# Patient Record
Sex: MALE | Race: WHITE | Employment: UNEMPLOYED | URBAN - METROPOLITAN AREA
[De-identification: names, ages, dates, MRNs, and addresses within clinical notes are randomized per-mention and may not be internally consistent; named-entity substitution may affect disease eponyms.]

---

## 2020-12-23 ENCOUNTER — HOSPITAL ENCOUNTER (OUTPATIENT)
Dept: INFUSION THERAPY | Age: 55
Discharge: HOME | End: 2020-12-23

## 2020-12-23 ENCOUNTER — INITIAL CONSULT (OUTPATIENT)
Dept: ONCOLOGY | Age: 55
End: 2020-12-23

## 2020-12-23 VITALS
SYSTOLIC BLOOD PRESSURE: 147 MMHG | BODY MASS INDEX: 23.25 KG/M2 | OXYGEN SATURATION: 96 % | DIASTOLIC BLOOD PRESSURE: 100 MMHG | WEIGHT: 157 LBS | RESPIRATION RATE: 16 BRPM | HEIGHT: 69 IN | HEART RATE: 88 BPM | TEMPERATURE: 96.9 F

## 2020-12-23 DIAGNOSIS — C18.7 MALIGNANT NEOPLASM OF SIGMOID COLON (HCC): ICD-10-CM

## 2020-12-23 LAB
ALBUMIN SERPL-MCNC: 4.7 GM/DL (ref 3.4–5)
ALP BLD-CCNC: 80 IU/L (ref 40–128)
ALT SERPL-CCNC: 11 U/L (ref 10–40)
ANION GAP SERPL CALCULATED.3IONS-SCNC: 13 MMOL/L (ref 4–16)
AST SERPL-CCNC: 13 IU/L (ref 15–37)
BASOPHILS ABSOLUTE: 0.1 K/CU MM
BASOPHILS RELATIVE PERCENT: 0.4 % (ref 0–1)
BILIRUB SERPL-MCNC: 1 MG/DL (ref 0–1)
BUN BLDV-MCNC: 16 MG/DL (ref 6–23)
CALCIUM SERPL-MCNC: 9.2 MG/DL (ref 8.3–10.6)
CEA: 3.2 NG/ML
CHLORIDE BLD-SCNC: 106 MMOL/L (ref 99–110)
CO2: 22 MMOL/L (ref 21–32)
CREAT SERPL-MCNC: 1.1 MG/DL (ref 0.9–1.3)
DIFFERENTIAL TYPE: ABNORMAL
EOSINOPHILS ABSOLUTE: 0.1 K/CU MM
EOSINOPHILS RELATIVE PERCENT: 1.1 % (ref 0–3)
ERYTHROCYTE SEDIMENTATION RATE: 5 MM/HR (ref 0–20)
GFR AFRICAN AMERICAN: >60 ML/MIN/1.73M2
GFR NON-AFRICAN AMERICAN: >60 ML/MIN/1.73M2
GLUCOSE BLD-MCNC: 97 MG/DL (ref 70–99)
HCT VFR BLD CALC: 42.3 % (ref 42–52)
HEMOGLOBIN: 14.9 GM/DL (ref 13.5–18)
LACTATE DEHYDROGENASE: 196 IU/L (ref 120–246)
LYMPHOCYTES ABSOLUTE: 1.9 K/CU MM
LYMPHOCYTES RELATIVE PERCENT: 16 % (ref 24–44)
MCH RBC QN AUTO: 30.9 PG (ref 27–31)
MCHC RBC AUTO-ENTMCNC: 35.2 % (ref 32–36)
MCV RBC AUTO: 87.8 FL (ref 78–100)
MONOCYTES ABSOLUTE: 0.8 K/CU MM
MONOCYTES RELATIVE PERCENT: 6.7 % (ref 0–4)
PDW BLD-RTO: 16.1 % (ref 11.7–14.9)
PLATELET # BLD: 247 K/CU MM (ref 140–440)
PMV BLD AUTO: 10.4 FL (ref 7.5–11.1)
POTASSIUM SERPL-SCNC: 4.1 MMOL/L (ref 3.5–5.1)
RBC # BLD: 4.82 M/CU MM (ref 4.6–6.2)
SEGMENTED NEUTROPHILS ABSOLUTE COUNT: 9.2 K/CU MM
SEGMENTED NEUTROPHILS RELATIVE PERCENT: 75.8 % (ref 36–66)
SODIUM BLD-SCNC: 141 MMOL/L (ref 135–145)
TOTAL PROTEIN: 7.1 GM/DL (ref 6.4–8.2)
WBC # BLD: 12.1 K/CU MM (ref 4–10.5)

## 2020-12-23 PROCEDURE — 99204 OFFICE O/P NEW MOD 45 MIN: CPT | Performed by: INTERNAL MEDICINE

## 2020-12-23 PROCEDURE — 36415 COLL VENOUS BLD VENIPUNCTURE: CPT

## 2020-12-23 PROCEDURE — 80053 COMPREHEN METABOLIC PANEL: CPT

## 2020-12-23 PROCEDURE — 85652 RBC SED RATE AUTOMATED: CPT

## 2020-12-23 PROCEDURE — 99201 HC NEW PT, OUTPT VISIT LEVEL 1: CPT

## 2020-12-23 PROCEDURE — 83615 LACTATE (LD) (LDH) ENZYME: CPT

## 2020-12-23 PROCEDURE — 82378 CARCINOEMBRYONIC ANTIGEN: CPT

## 2020-12-23 PROCEDURE — 85025 COMPLETE CBC W/AUTO DIFF WBC: CPT

## 2020-12-23 RX ORDER — OXYCODONE HYDROCHLORIDE 15 MG/1
15 TABLET ORAL EVERY 4 HOURS PRN
Qty: 90 TABLET | Refills: 0 | Status: SHIPPED | OUTPATIENT
Start: 2020-12-23 | End: 2020-12-23

## 2020-12-23 RX ORDER — OXYCODONE HYDROCHLORIDE 15 MG/1
15 TABLET ORAL EVERY 4 HOURS PRN
Qty: 90 TABLET | Refills: 0 | Status: SHIPPED | OUTPATIENT
Start: 2020-12-23 | End: 2021-01-04 | Stop reason: SDUPTHER

## 2020-12-23 SDOH — HEALTH STABILITY: MENTAL HEALTH: HOW OFTEN DO YOU HAVE A DRINK CONTAINING ALCOHOL?: NOT ASKED

## 2020-12-23 SDOH — HEALTH STABILITY: MENTAL HEALTH: HOW MANY STANDARD DRINKS CONTAINING ALCOHOL DO YOU HAVE ON A TYPICAL DAY?: NOT ASKED

## 2020-12-23 ASSESSMENT — PATIENT HEALTH QUESTIONNAIRE - PHQ9
SUM OF ALL RESPONSES TO PHQ9 QUESTIONS 1 & 2: 0
1. LITTLE INTEREST OR PLEASURE IN DOING THINGS: 0
2. FEELING DOWN, DEPRESSED OR HOPELESS: 0
SUM OF ALL RESPONSES TO PHQ QUESTIONS 1-9: 0

## 2020-12-23 NOTE — PROGRESS NOTES
Patient Name:  Lester Mehta  Patient :  1965  Patient MRN:  [id number]     Primary Oncologist: Kevin Jewell MD  Referring Provider: No primary care provider on file. Date of Service: 2020      Reason for Consult: To evaluate the patient with colon cancer. Chief Complaint:    Chief Complaint   Patient presents with    New Patient     Patient active problem list:    Colon cancer    HPI:   Otoniel Duffy is a 39-year-old very pleasant gentleman who recently moves to [facility], presented to me on 20 to establish care for his colorectal cancer. He stated that he was diagnosed with terminal colorectal cancer around May 2020. He stated that he chose not to have further therapy at that time. He was given 5 months to live only. He stated that he has significant pain in his perianal area and complains of bleeding per rectum. I couldn't get proper records from [place] yet. Besides pain and bleeding, he doesn't have any other significant symptoms at today visit. Past Medical History:     Significant for  1. Colon cancer                                                    Past Surgery History:    No pertinent surgical history. Social History:   He denies smoking, alcohol drinking or illicit drug abuse. He is currently living in [facility]. Family History:    Significant for cancer in his brother. No Known Allergies    No current outpatient medications on file prior to visit. No current facility-administered medications on file prior to visit. Review of Systems:  Constitutional:  Has weight loss, No fever, No chills, No night sweats. Energy level is low.   Eyes:  No diplopia, No transient or permanent loss of treatment if that will help with his condition. I will request PET/CT scan to determine the extent of his disease. I will see him back after PET/CT scan. Since he has significant pain, I will continue with his pain medications for now. I answered all his questions and concerns for today. Thank you.

## 2020-12-23 NOTE — PROGRESS NOTES
MA Rooming Questions  Patient: Pepe Mann  MRN: [id number]    Date: 12/23/2020        New Patient    5. Did the patient have a depression screening completed today?  Yes    PHQ-9 Total Score: 0 (12/23/2020  2:16 PM)       PHQ-9 Given to (if applicable):               PHQ-9 Score (if applicable):                     [] Positive     []  Negative              Does question #9 need addressed (if applicable)                     [] Yes    []  No               Camille Nation MA

## 2021-01-03 NOTE — PROGRESS NOTES
Patient Name:  Hedy Neri  Patient :  1965  Patient MRN:  [id number]     Primary Oncologist: Navdeep Ramirez MD  Referring Provider: No primary care provider on file. Date of Service: 2021     Chief Complaint:    Chief Complaint   Patient presents with    Follow-up     Patient active problem list:    Colon cancer    HPI:   Marta Ruelas is a 26-year-old very pleasant gentleman who recently moves to [facility], presented to me on 20 to establish care for his colorectal cancer. He stated that he was diagnosed with terminal colorectal cancer around May 2020. He stated that he chose not to have further therapy at that time. He was given 5 months to live only. He stated that he has significant pain in his perianal area and complains of bleeding per rectum. Laboratory work ups done on 20 showed mild leukocytosis. Rests of blood tests, including hemoglobin, LDH, ESR, CEA, kidney and liver function were normal.     According to interventional pulmonary note from 2019, he doesn't seem to have any malignant process. He claimed to have lung cancer at that time. CT scan of the chest done on 2019 showed tiny nodule in the left upper lobe with some minimal associated groundglass density, overall measuring less than 6 mm. Based on the presence of his part solid nodule with a solid component measuring at or below 5 mm, with stable edema over the last 2 months, radiologist recommend additional follow-up in 15 months. On 2020, he presented to me for follow-up. On today visit, I reviewed with him findings on laboratory test.  He is scheduled for PET scan on 2021 and I will see him again after that. He claimed that he has metastatic colon cancer and I am very much concerned about wrong informations. I recommended to have PET scan as planned and I will see him again after that.     He continues to complain above bleeding per rectum and rectal pain.  However his hemoglobin is completely normal.    Meanwhile, I will make a referral to pain management for further evaluation. He does not have any other new significant symptoms at today's visit. Past Medical History:     Significant for  1. Colon cancer                                                    Past Surgery History:    No pertinent surgical history. Social History:   He denies smoking, alcohol drinking or illicit drug abuse. He is currently living in [place]. Family History:    Significant for cancer in his brother. Allergies:  No known drug allergies. Review of Systems: \"Per interval history; otherwise 10 point ROS is negative. \"  His energy level is fair, appetite, and sleep are okay. He denies fever, chills, night sweats, cough, shortness of breath, chest pain, hemoptysis, or palpitations. His bowel and bladder functions are normal. He doesn't have nausea, vomiting, abdominal pain, diarrhea, constipation, dysuria, loss of appetite, or weight loss. He denies neuropathy. He complains of bleeding per rectum. He denies any clotting issue. He complains of pain in his perineal area. Denies anxiety or depression. The rest of the systems are unremarkable.       Vital Signs: BP (!) 150/91 (Site: Left Upper Arm, Position: Sitting, Cuff Size: Medium Adult)   Pulse 94   Temp 97.1 °F (36.2 °C) (Infrared)   Resp 18   Ht 5' 9\" (1.753 m)   Wt 158 lb 12.8 oz (72 kg)   SpO2 98%   BMI 23.45 kg/m²      Physical Exam:  CONSTITUTIONAL: awake, cooperative, no apparent distress, alert,    EYES: pupils equal, round and reactive to light, sclera clear and conjunctiva normal  ENT: Normocephalic, atraumatic, without obvious abnormality,   NECK: supple, symmetrical, no jugular venous distension and no carotid bruits   HEMATOLOGIC/LYMPHATIC: no cervical, supraclavicular or axillary lymphadenopathy   LUNGS: VBS, no rhonchi, no increased work of breathing and clear to auscultation, no wheezes, no crackles,    CARDIOVASCULAR: regular rate and rhythm, normal S1 and S2, no murmur noted  ABDOMEN: soft, non-distended, normal bowel sounds x 4, non-tender, no masses palpated, no hepatosplenomegaly   MUSCULOSKELETAL: full range of motion noted, tone is normal  NEUROLOGIC: awake, alert, oriented to name, place and time. Motor skills grossly intact. SKIN: Normal skin color, texture, turgor and no jaundice.  appears intact   EXTREMITIES: no cyanosis, no clubbing, no LE edema, no leg swelling      Labs:  Hematology:  Lab Results   Component Value Date    WBC 12.1 (H) 12/23/2020    RBC 4.82 12/23/2020    HGB 14.9 12/23/2020    HCT 42.3 12/23/2020    MCV 87.8 12/23/2020    MCH 30.9 12/23/2020    MCHC 35.2 12/23/2020    RDW 16.1 (H) 12/23/2020     12/23/2020    MPV 10.4 12/23/2020    SEGSPCT 75.8 (H) 12/23/2020    EOSRELPCT 1.1 12/23/2020    BASOPCT 0.4 12/23/2020    LYMPHOPCT 16.0 (L) 12/23/2020    MONOPCT 6.7 (H) 12/23/2020    SEGSABS 9.2 12/23/2020    EOSABS 0.1 12/23/2020    BASOSABS 0.1 12/23/2020    LYMPHSABS 1.9 12/23/2020    MONOSABS 0.8 12/23/2020    DIFFTYPE AUTOMATED DIFFERENTIAL 12/23/2020     Lab Results   Component Value Date    ESR 5 12/23/2020     Chemistry:  Lab Results   Component Value Date     12/23/2020    K 4.1 12/23/2020     12/23/2020    CO2 22 12/23/2020    BUN 16 12/23/2020    CREATININE 1.1 12/23/2020    GLUCOSE 97 12/23/2020    CALCIUM 9.2 12/23/2020    PROT 7.1 12/23/2020    LABALBU 4.7 12/23/2020    BILITOT 1.0 12/23/2020    ALKPHOS 80 12/23/2020    AST 13 (L) 12/23/2020    ALT 11 12/23/2020    LABGLOM >60 12/23/2020    GFRAA >60 12/23/2020     Lab Results   Component Value Date     12/23/2020     No components found for: LD  No results found for: TSHHS, T4FREE, FT3  Immunology:  Lab Results   Component Value Date    PROT 7.1 12/23/2020     No results found for: Sole Freeze, KLFLCR  No results found for: B2M  Coagulation Panel:  No results found for: PROTIME, INR, APTT, DDIMER  Anemia Panel:  No results found for: [id number], FOLATE  Tumor Markers:  Lab Results   Component Value Date    CEA 3.2 12/23/2020        Observations:  No data recorded     Assessment   Colorectal cancer     Plan:                                                                                                              July Simpson is a 40-year-old very pleasant gentleman who recently moves to [facility], presented to me on 12/23/20 to establish care for his colorectal cancer. He stated that he was diagnosed with terminal colorectal cancer around May 2020. He stated that he chose not to have further therapy at that time. He was given 5 months to live only. He stated that he has significant pain in his perianal area and complains of bleeding per rectum. Laboratory work ups done on 12/23/20 showed mild leukocytosis. Rests of blood tests, including hemoglobin, LDH, ESR, CEA, kidney and liver function were normal.     According to interventional pulmonary note from 2/8/2019, he doesn't seem to have any malignant process. He claimed to have lung cancer at that time. CT scan of the chest done on February 8, 2019 showed tiny nodule in the left upper lobe with some minimal associated groundglass density, overall measuring less than 6 mm. Based on the presence of his part solid nodule with a solid component measuring at or below 5 mm, with stable edema over the last 2 months, radiologist recommend additional follow-up in 15 months. On January 4, 2020, he presented to me for follow-up.   On today visit, I reviewed with him findings on laboratory test.  He is scheduled for PET scan on January 7, 2021 and I will see him again after that.    He claimed that he has metastatic colon cancer and I am very much concerned about wrong informations. I recommended to have PET scan as planned and I will see him again after that. He continues to complain above bleeding per rectum and rectal pain. However his hemoglobin is completely normal.    Meanwhile, I will make a referral to pain management for further evaluation. I answered all his questions and concerns for today. Thank you.

## 2021-01-04 ENCOUNTER — OFFICE VISIT (OUTPATIENT)
Dept: ONCOLOGY | Age: 56
End: 2021-01-04

## 2021-01-04 ENCOUNTER — HOSPITAL ENCOUNTER (OUTPATIENT)
Dept: INFUSION THERAPY | Age: 56
Discharge: HOME | End: 2021-01-04

## 2021-01-04 VITALS
OXYGEN SATURATION: 98 % | SYSTOLIC BLOOD PRESSURE: 150 MMHG | HEART RATE: 94 BPM | DIASTOLIC BLOOD PRESSURE: 91 MMHG | WEIGHT: 158.8 LBS | RESPIRATION RATE: 18 BRPM | HEIGHT: 69 IN | TEMPERATURE: 97.1 F | BODY MASS INDEX: 23.52 KG/M2

## 2021-01-04 DIAGNOSIS — C18.7 MALIGNANT NEOPLASM OF SIGMOID COLON (HCC): Primary | ICD-10-CM

## 2021-01-04 PROCEDURE — 99211 OFF/OP EST MAY X REQ PHY/QHP: CPT

## 2021-01-04 PROCEDURE — 99213 OFFICE O/P EST LOW 20 MIN: CPT | Performed by: INTERNAL MEDICINE

## 2021-01-04 RX ORDER — ATORVASTATIN CALCIUM 10 MG/1
10 TABLET, FILM COATED ORAL DAILY
COMMUNITY

## 2021-01-04 RX ORDER — OXYCODONE HYDROCHLORIDE 15 MG/1
15 TABLET ORAL EVERY 4 HOURS PRN
Qty: 12 TABLET | Refills: 0 | Status: SHIPPED | OUTPATIENT
Start: 2021-01-04 | End: 2021-01-06

## 2021-01-04 RX ORDER — OXYCODONE HYDROCHLORIDE 15 MG/1
15 TABLET ORAL EVERY 4 HOURS PRN
Qty: 12 TABLET | Refills: 0 | Status: SHIPPED | OUTPATIENT
Start: 2021-01-04 | End: 2021-01-04

## 2021-01-04 NOTE — PROGRESS NOTES
MA Rooming Questions  Patient: Kimberly Peñaloza  MRN: [id number]    Date: 1/4/2021        1. Do you have any new issues? yes - pain , SOB       2. Do you need any refills on medications? yes -     3. Have you had any imaging done since your last visit?   no    4. Have you been hospitalized or seen in the emergency room since your last visit here?   no    5. Did the patient have a depression screening completed today?  No    No data recorded     PHQ-9 Given to (if applicable):               PHQ-9 Score (if applicable):                     [] Positive     []  Negative              Does question #9 need addressed (if applicable)                     [] Yes    []  No               Racheal Cruz MA

## 2021-01-08 ENCOUNTER — TELEPHONE (OUTPATIENT)
Dept: ONCOLOGY | Age: 56
End: 2021-01-08

## 2021-01-08 ENCOUNTER — CLINICAL DOCUMENTATION (OUTPATIENT)
Dept: ONCOLOGY | Age: 56
End: 2021-01-08

## 2021-01-08 NOTE — PROGRESS NOTES
Called [facility] per req from Dr Amie Barrios, spoke to Camille, I advised her that Dr Amie Barrios feels patient may be trying to seek pain medications, has an uneasy feeling about the info that the patient is giving him, also results of labs that Dr Amie Barrios ordered and then patient didn't show for his PET Scan that was ordered on 01/07 or OV today, Camille @ [facility] states understanding

## 2021-01-08 NOTE — TELEPHONE ENCOUNTER
Called pt per  to inform pt that  wants to see him after PET/CT is complete. It was supposed to be done yesterday on 1/7/2021 and it was not complete. Pt did not answer so I left a msg for him to give us a call back.

## 2021-01-20 ENCOUNTER — HOSPITAL ENCOUNTER (EMERGENCY)
Age: 56
Discharge: LEFT BEFORE DISCHARGE | End: 2021-01-20

## 2021-01-20 NOTE — ED NOTES
Patient asked where the [facility] restroom was located and he got up and went. When coming out patient asked where his insurance card was because he had to go right now because they were going to tow away his car. Patient was handed his card and he then walked out. Dotty Joseph.  SUMI Kim  01/20/21 [id number]

## 2021-08-24 ENCOUNTER — TELEPHONE (OUTPATIENT)
Dept: ONCOLOGY | Age: 56
End: 2021-08-24

## 2021-08-24 NOTE — TELEPHONE ENCOUNTER
Pt called and left a VM stating he wants a refill on pain medication. I called him back and Informed him he has no showed the past couple office visits scheduled with , and that he was referred to pain management and was ordered a PET/CT which he did not have done. Pt stated understanding, he also stated [place]. Transferred him to [facility].

## 2021-08-26 ENCOUNTER — TELEPHONE (OUTPATIENT)
Dept: ONCOLOGY | Age: 56
End: 2021-08-26

## 2021-08-26 NOTE — TELEPHONE ENCOUNTER
Spoke with patient to verify if  he wanted records sent to the [facility] per message I received, patient states he is no longer seeking to establish care with that facility and states he will call me back to provide a new facility.

## 2022-09-20 ENCOUNTER — CLINICAL DOCUMENTATION (OUTPATIENT)
Dept: ONCOLOGY | Age: 57
End: 2022-09-20

## 2022-09-23 ENCOUNTER — CLINICAL DOCUMENTATION (OUTPATIENT)
Dept: ONCOLOGY | Age: 57
End: 2022-09-23

## 2022-09-23 NOTE — PROGRESS NOTES
This nurse received a call from [facility] stating that their oncology department is requesting to cancel the patient's appointment and refer him to a GI doctor, however his insurance is not accepted. This nurse attempted to call the patient at [phone] however there was no answer and no option to leave a VM.